# Patient Record
(demographics unavailable — no encounter records)

---

## 2024-10-30 NOTE — DATA REVIEWED
[FreeTextEntry1] : Carotid duplex right ICA is occluded left internal carotid artery is greater than 70% stenosis however the peak systolic velocity is 265 and end-diastolic velocity 65.

## 2024-10-30 NOTE — ASSESSMENT
[FreeTextEntry1] : The patient is a 78-year-old male who presents for follow-up.  The patient has a history of a TIA in his right eye and right ICA occlusion.  The patient has known moderate left ICA stenosis.  I performed a carotid duplex today in my office which shows a 70% left internal carotid artery stenosis.  I recommend continuing with conservative management and I will see the patient back in my office in 6 months time or sooner if any new symptoms develop.  I spent a total of 25 minutes examining the patient discussing the test results and providing a treatment plan.   I, Dr. Mackenzie, personally performed the evaluation and management (E/M) services for this established patient who presents today with (a) new problem(s)/exacerbation of (an) existing condition(s). That E/M includes conducting the clinically appropriate interval history &/or exam, assessing all new/exacerbated conditions, and establishing a new plan of care. Today, my GURPREET, Dilia], was here to observe my evaluation and management service for this new problem/exacerbated condition and follow the plan of care established by me going forward.  Thank you for allowing me to participate in the care of your patient.   Sincerely,   Bigg Mackenzie MD, RPVI, FACS Associate Professor of Surgery , Vascular Fellowship Director of Limb Salvage Surgery [facility] [employer] [facility] at [facility]

## 2024-12-20 NOTE — ASSESSMENT
[FreeTextEntry1] : 78 year old male with chronic lumbar radiculopathy and polyneuropathy. Patient will continue  physical therapy abnd gabapentin 300mg at night. Patient warned of the potential side effect of medication and he reported understanding. He will RTO in 6 months barring issues.   Supervising Physician : Bello Gomez DO

## 2024-12-20 NOTE — HISTORY OF PRESENT ILLNESS
[FreeTextEntry1] : Previous Encounter : It's a pleasure to see Mr. LUCILLE FABIAN In the office today. He is a 78 year -  old man  who presents to the office today for the evaluation of chronic lumbar radiculopathy for over 50 years.  He has tried physical therapy, acupuncture, pain management or with temporary improvement only.  He has not seen a neurologist for a long time and conditions continue to get worse gradually over time.  He also reports limited mobility with only the capacity of walking 3 blocks before he needed to stop.  He has been aware of the surgical option for his back but has tried to avoid it.  Diagnostic Testing :  MRI Lumbar Spine 7.2.24 : Multilevel degenerative disc disease with a diffuse annular bulge at L3-4 producing a moderate degree of lumbar stenosis. Small annular bulges and associate osteophytic ridges are seen at L5-S1 with slight thecal sac compression and at L4-5 with slight mild thecal sac compression. Moderate to severe lumbar stenosis at L3-4.  EMG Lower Extremities : Chronic sensory - motor polyneuropathy in bilateral lower extremities.   Today : Today I had the pleasure of seeing Mr. FABIAN in our office for follow up.  Their past medical history and imaging have been reviewed.   Mr. Fabian is a 78 year old male under our care for chronic lumbar radiculopathy.  MRI of the Lumbar spine is  noted above and EMG testing of the lower extremities which revealed chronic sensory - motor polyneuropathy in bilateral lower extremities. Patient has tried physical therapy, acupuncture, and pain management and remains on gabapentin 300mg at night. Patient states that his back pain has remained "tolerable" since his last visit and his neuropathy is well controlled with gabapentin. Patient requests refill of his medication today and denies progressive symptoms. OF Note patient has noticed RLE swelling without pain or heat. He will be following up with his PCP and cardiologist for potential LE U/S and evaluation. He denies trauma, SOB, or difficulty ambulating.

## 2025-04-23 NOTE — HISTORY OF PRESENT ILLNESS
[FreeTextEntry1] : Mr. Crain is a 80 yo male with a history of a TIA in his right eye and right ICA occlusion, and moderate left ICA  stenosis.

## 2025-04-23 NOTE — HISTORY OF PRESENT ILLNESS
[FreeTextEntry1] : Mr. Crain is a 78 yo male with a history of a TIA in his right eye and right ICA occlusion, and moderate left ICA  stenosis.

## 2025-04-23 NOTE — ASSESSMENT
[FreeTextEntry1] : The patient is a 79-year-old male who presents for follow-up.  The patient has a history of a TIA in his right eye and right ICA occlusion.  The patient has known moderate left ICA stenosis.  I performed a carotid duplex today in my office which shows 50-69 left internal carotid artery stenosis.  I recommend continuing with conservative management and I will see the patient back in my office in 6 months time or sooner if any new symptoms develop.  I spent a total of 25 minutes examining the patient discussing the test results and providing a treatment plan.   I, Dr. Mackenzie, personally performed the evaluation and management (E/M) services for this established patient who presents today with (a) new problem(s)/exacerbation of (an) existing condition(s). That E/M includes conducting the clinically appropriate interval history &/or exam, assessing all new/exacerbated conditions, and establishing a new plan of care. Today, my GURPREET, Immy was here to observe my evaluation and management service for this new problem/exacerbated condition and follow the plan of care established by me going forward.  Thank you for allowing me to participate in the care of your patient.   Sincerely,   Bigg Mackenzie MD, RPVI, FACS Associate Professor of Surgery , Vascular Fellowship Director of Limb Salvage Surgery [facility] at [facility]

## 2025-04-23 NOTE — REASON FOR VISIT
[Follow-Up: _____] : a [unfilled] follow-up visit
[Follow-Up: _____] : a [unfilled] follow-up visit
Bed in lowest position, wheels locked, appropriate side rails in place/Call bell, personal items and telephone in reach/Instruct patient to call for assistance before getting out of bed or chair/Non-slip footwear when patient is out of bed/[place] to call system/Physically safe environment - no spills, clutter or unnecessary equipment/Purposeful Proactive Rounding/Room/bathroom lighting operational, light cord in reach

## 2025-04-23 NOTE — DATA REVIEWED
[FreeTextEntry1] : Carotid duplex right ICA is occluded left internal carotid artery is greater than 50 to 69% stenosis stenosis however the peak systolic velocity is 235 and end-diastolic velocity 65

## 2025-04-23 NOTE — ASSESSMENT
[FreeTextEntry1] : The patient is a 79-year-old male who presents for follow-up.  The patient has a history of a TIA in his right eye and right ICA occlusion.  The patient has known moderate left ICA stenosis.  I performed a carotid duplex today in my office which shows 50-69 left internal carotid artery stenosis.  I recommend continuing with conservative management and I will see the patient back in my office in 6 months time or sooner if any new symptoms develop.  I spent a total of 25 minutes examining the patient discussing the test results and providing a treatment plan.   I, Dr. Mackenzie, personally performed the evaluation and management (E/M) services for this established patient who presents today with (a) new problem(s)/exacerbation of (an) existing condition(s). That E/M includes conducting the clinically appropriate interval history &/or exam, assessing all new/exacerbated conditions, and establishing a new plan of care. Today, my GURPREET, YogaTrail was here to observe my evaluation and management service for this new problem/exacerbated condition and follow the plan of care established by me going forward.  Thank you for allowing me to participate in the care of your patient.   Sincerely,   Bigg Mackenzie MD, RPVI, FACS Associate Professor of Surgery , Vascular Fellowship Director of Limb Salvage Surgery [facility] [employer] [facility] at \[facility]\""/[facility]

## 2025-06-20 NOTE — ASSESSMENT
[FreeTextEntry1] : 1. Lumbar Spinal Stenosis - Condition appears stable, not worsening - Referral to physical therapy for strengthening exercises - Referral to pain management for consideration of interventional procedures  2. Peripheral Neuropathy - Contributing to balance issues and walking difficulties - Will review recent [facility] lab results to evaluate for secondary causes -may need additional blood work done  3. Cervical Radiculopathy (suspected) - Ordering MRI of cervical spine to evaluate for nerve impingement causing right arm symptoms  4. Knee Pain/Weakness - Referral to orthopedics for evaluation of knee pain and weakness

## 2025-06-20 NOTE — HISTORY OF PRESENT ILLNESS
[FreeTextEntry1] : 31-year-old female presents for follow-up of spine pain and numbness. Patient reports she has been waiting for a month for this appointment. She describes having a bulging disc in the spine with associated numbness and tingling sensations. Patient is particularly concerned about the numbness rather than the pain. She reports burning sensations in her chest area and is worried this might be cardiac-related, though she believes it is musculoskeletal. Patient also mentions experiencing pressure in her head, which she attributes to possible sinus issues. She expresses anxiety about serious conditions such as aneurysm, MS, and other neurological disorders despite previous negative workup. Patient reports she has not tried the previously prescribed gabapentin due to concerns about side effects and medication safety. She mentions having two children and wonders if pregnancy could have contributed to her spinal issues.